# Patient Record
Sex: FEMALE | Race: WHITE | NOT HISPANIC OR LATINO | URBAN - METROPOLITAN AREA
[De-identification: names, ages, dates, MRNs, and addresses within clinical notes are randomized per-mention and may not be internally consistent; named-entity substitution may affect disease eponyms.]

---

## 2018-02-15 ENCOUNTER — NURSE TRIAGE (OUTPATIENT)
Dept: ADMINISTRATIVE | Facility: CLINIC | Age: 23
End: 2018-02-15

## 2018-02-15 NOTE — TELEPHONE ENCOUNTER
"  Reason for Disposition   Earache  (Exceptions: brief ear pain of < 60 minutes duration, earache occurring during air travel    Answer Assessment - Initial Assessment Questions  1. LOCATION: "Which ear is involved?"      Right ear- feels pressure which is worse when bending over  2. ONSET: "When did the ear start hurting"       Earlier this evening  3. SEVERITY: "How bad is the pain?"  (Scale 1-10; mild, moderate or severe)    - MILD (1-3): doesn't interfere with normal activities     - MODERATE (4-7): interferes with normal activities or awakens from sleep     - SEVERE (8-10): excruciating pain, unable to do any normal activities       6-7/10 at this time  4. URI SYMPTOMS: " Do you have a runny nose or cough?"      Denied congestion- seen today for sore throat  5. FEVER: "Do you have a fever?" If so, ask: "What is your temperature, how was it measured, and when did it start?"      Not at this time  6. CAUSE: "Have you been swimming recently?", "How often do you use Q-TIPS?", "Have you had any recent air travel or scuba diving?"      Not reported  7. OTHER SYMPTOMS: "Do you have any other symptoms?" (e.g., headache, stiff neck, dizziness, vomiting, runny nose, decreased hearing)      Sore throat  8. PREGNANCY: "Is there any chance you are pregnant?" "When was your last menstrual period?"      Denied  Took tylenol about 1900- took motrin 400 mg recently    Protocols used:  EARACHE-A-    "

## 2018-02-22 ENCOUNTER — CLINICAL SUPPORT (OUTPATIENT)
Dept: OCCUPATIONAL MEDICINE | Facility: CLINIC | Age: 23
End: 2018-02-22

## 2018-02-22 DIAGNOSIS — Z02.1 PRE-EMPLOYMENT DRUG SCREENING: Primary | ICD-10-CM

## 2018-02-22 PROCEDURE — 80305 DRUG TEST PRSMV DIR OPT OBS: CPT | Mod: S$GLB,,, | Performed by: FAMILY MEDICINE

## 2020-06-17 ENCOUNTER — APPOINTMENT (OUTPATIENT)
Dept: OTHER | Facility: HOSPITAL | Age: 25
End: 2020-06-17
Attending: FAMILY MEDICINE

## 2020-06-24 ENCOUNTER — APPOINTMENT (OUTPATIENT)
Dept: OTHER | Facility: HOSPITAL | Age: 25
End: 2020-06-24
Attending: FAMILY MEDICINE

## 2020-07-08 ENCOUNTER — APPOINTMENT (OUTPATIENT)
Dept: OTHER | Facility: HOSPITAL | Age: 25
End: 2020-07-08
Attending: PREVENTIVE MEDICINE